# Patient Record
Sex: FEMALE | URBAN - METROPOLITAN AREA
[De-identification: names, ages, dates, MRNs, and addresses within clinical notes are randomized per-mention and may not be internally consistent; named-entity substitution may affect disease eponyms.]

---

## 2023-10-23 ENCOUNTER — APPOINTMENT (RX ONLY)
Dept: URBAN - METROPOLITAN AREA CLINIC 66 | Facility: CLINIC | Age: 58
Setting detail: DERMATOLOGY
End: 2023-10-23

## 2023-10-23 DIAGNOSIS — Q826 OTHER SPECIFIED ANOMALIES OF SKIN: ICD-10-CM

## 2023-10-23 DIAGNOSIS — L57.8 OTHER SKIN CHANGES DUE TO CHRONIC EXPOSURE TO NONIONIZING RADIATION: ICD-10-CM

## 2023-10-23 DIAGNOSIS — Q828 OTHER SPECIFIED ANOMALIES OF SKIN: ICD-10-CM

## 2023-10-23 DIAGNOSIS — Q819 OTHER SPECIFIED ANOMALIES OF SKIN: ICD-10-CM

## 2023-10-23 DIAGNOSIS — L82.1 OTHER SEBORRHEIC KERATOSIS: ICD-10-CM

## 2023-10-23 PROBLEM — L85.8 OTHER SPECIFIED EPIDERMAL THICKENING: Status: ACTIVE | Noted: 2023-10-23

## 2023-10-23 PROCEDURE — ? SKIN MEDICINALS

## 2023-10-23 PROCEDURE — 99203 OFFICE O/P NEW LOW 30 MIN: CPT

## 2023-10-23 PROCEDURE — ? ADDITIONAL NOTES

## 2023-10-23 PROCEDURE — ? COUNSELING

## 2023-10-23 ASSESSMENT — LOCATION SIMPLE DESCRIPTION DERM
LOCATION SIMPLE: RIGHT FOREARM
LOCATION SIMPLE: RIGHT POSTERIOR UPPER ARM
LOCATION SIMPLE: LEFT CHEEK
LOCATION SIMPLE: LEFT POSTERIOR UPPER ARM
LOCATION SIMPLE: RIGHT CHEEK
LOCATION SIMPLE: RIGHT PRETIBIAL REGION

## 2023-10-23 ASSESSMENT — LOCATION DETAILED DESCRIPTION DERM
LOCATION DETAILED: RIGHT DISTAL DORSAL FOREARM
LOCATION DETAILED: RIGHT PROXIMAL PRETIBIAL REGION
LOCATION DETAILED: RIGHT INFERIOR CENTRAL MALAR CHEEK
LOCATION DETAILED: LEFT INFERIOR CENTRAL MALAR CHEEK
LOCATION DETAILED: RIGHT DISTAL POSTERIOR UPPER ARM
LOCATION DETAILED: LEFT DISTAL POSTERIOR UPPER ARM

## 2023-10-23 ASSESSMENT — LOCATION ZONE DERM
LOCATION ZONE: LEG
LOCATION ZONE: FACE
LOCATION ZONE: ARM

## 2023-10-23 NOTE — PROCEDURE: ADDITIONAL NOTES
Detail Level: Simple
Additional Notes: Discussed trialing glycolic acid such as Glytone's KP kit, reviewed that this could cause irritation and to stop if it does so.
Render Risk Assessment In Note?: no

## 2023-10-23 NOTE — PROCEDURE: [facility]IC[facility]
Sig: Apply a thin layer to the affected areas twice daily
Sig: Apply to affected areas twice daily
Sig: Use as directed when washing hair
Sig: Apply to affected areas on face twice daily
Sig: Take one pill daily
Sig: Apply a thin layer to the affected nails daily
Sig: Apply to the affected skin twice daily
Sig: Take one twice daily
Sig: Apply nightly to warts nightly under occlusion
Sig: Take one pill twice daily
Sig: Apply pea sized amount per area at night
Sig: Apply twice daily for 5 days
Sig: Apply a thin layer to the itching areas twice daily as needed
Sig: Apply a thin layer to the affected areas daily
Sig: Apply a thin layer to the areas with decreased hair density 1-2 times daily
Sig: Apply a thin layer to the affected skin twice daily
Lightening Cream Prescription 1: Azealic Acid 20%, Kojic Acid 6%, Niacinamide 4% Cream
Sig: Wash affected areas daily.
Sig: Apply twice daily to areas of darker or uneven pigmentation
Sig: Apply a thin layer to the scar daily
Detail Level: Simple
Product Type (1): Lightening Cream
Intro Statement: I recommended the following products:

## 2023-11-09 ENCOUNTER — APPOINTMENT (RX ONLY)
Dept: URBAN - METROPOLITAN AREA CLINIC 68 | Facility: CLINIC | Age: 58
Setting detail: DERMATOLOGY
End: 2023-11-09

## 2023-11-09 DIAGNOSIS — Z41.9 ENCOUNTER FOR PROCEDURE FOR PURPOSES OTHER THAN REMEDYING HEALTH STATE, UNSPECIFIED: ICD-10-CM

## 2023-11-09 PROCEDURE — ? COSMETIC CONSULTATION: FILLERS

## 2023-11-09 PROCEDURE — ? COSMETIC CONSULTATION: BOTOX

## 2023-11-09 PROCEDURE — ? COSMETIC CONSULTATION: MICRONEEDLING

## 2023-11-09 PROCEDURE — ? BOTOX

## 2023-11-09 NOTE — PROCEDURE: BOTOX
Levator Labii Superioris Units: 0
Show Additional Area 1: Yes
Consent: Written consent obtained. Risks include but not limited to lid/brow ptosis, bruising, swelling, diplopia, temporary effect, incomplete chemical denervation.
Show Mentalis Units: No
Post-Care Instructions: Patient instructed to not lie down for 4 hours and limit physical activity for 24 hours.
Dilution (U/0.1 Cc): 4
Detail Level: Detailed
Show Inventory Tab: Hide
Incrementing Botox Units: By 0.5 Units

## 2023-12-05 ENCOUNTER — APPOINTMENT (RX ONLY)
Dept: URBAN - METROPOLITAN AREA CLINIC 68 | Facility: CLINIC | Age: 58
Setting detail: DERMATOLOGY
End: 2023-12-05

## 2023-12-05 DIAGNOSIS — Z41.9 ENCOUNTER FOR PROCEDURE FOR PURPOSES OTHER THAN REMEDYING HEALTH STATE, UNSPECIFIED: ICD-10-CM

## 2023-12-05 PROCEDURE — ? FILLERS

## 2023-12-05 PROCEDURE — ? SYLFIRM X

## 2023-12-05 NOTE — PROCEDURE: FILLERS
Decollete Filler Volume In Cc: 0
Map Statment: See Attach Map for Complete Details
Include Cannula Information In Note?: No
Anesthesia Type: 1% lidocaine with epinephrine
Filler: Radiesse
Anesthesia Volume In Cc: 0.5
Aspiration Statement: Aspiration was performed prior to injecting / site with filler.
Consent: Written consent obtained. Risks include but not limited to bruising, beading, irregular texture, ulceration, infection, allergic reaction, scar formation, incomplete augmentation, temporary nature, and procedural pain.
Detail Level: Detailed
Additional Anesthesia Volume In Cc: 6
Post-Care Instructions: After the procedure, patient instructed to apply ice to reduce swelling.

## 2023-12-05 NOTE — PROCEDURE: SYLFIRM X
Patient Reported Discomfort: 0
Consent: Written consent obtained, risks reviewed including but not limited to darker or lighter pigmentary change, and/or incomplete improvement.
Detail Level: Simple
Post-Care Instructions: I reviewed with the patient in detail post-care instructions. Patient should apply moisturizer until fully healed.
Infusion: '
Render Post-Care In The Note: Yes
Use Distraction Techniques In Note: No

## 2023-12-12 ENCOUNTER — APPOINTMENT (RX ONLY)
Dept: URBAN - METROPOLITAN AREA CLINIC 68 | Facility: CLINIC | Age: 58
Setting detail: DERMATOLOGY
End: 2023-12-12

## 2023-12-12 DIAGNOSIS — Z41.9 ENCOUNTER FOR PROCEDURE FOR PURPOSES OTHER THAN REMEDYING HEALTH STATE, UNSPECIFIED: ICD-10-CM

## 2023-12-12 PROCEDURE — ? DIAMONDGLOW

## 2023-12-12 ASSESSMENT — LOCATION SIMPLE DESCRIPTION DERM: LOCATION SIMPLE: LEFT FOREHEAD

## 2023-12-12 ASSESSMENT — LOCATION ZONE DERM: LOCATION ZONE: FACE

## 2023-12-12 ASSESSMENT — LOCATION DETAILED DESCRIPTION DERM: LOCATION DETAILED: LEFT LATERAL FOREHEAD

## 2023-12-12 NOTE — PROCEDURE: DIAMONDGLOW
Body Treatment Head Used?: Not Used
Solution Used: Skin Brightening
Number Of Passes: 4
Consent: Written consent obtained, risks reviewed including but not limited to crusting, scabbing, blistering, scarring, darker or lighter pigmentary change, bruising, and/or incomplete response.
Intelliflow Setting: 100%
Vacuum Pressure In Inches: 3.5
Post-Care Instructions: I reviewed with the patient in detail post-care instructions. Patient should stay away from the sun and wear sun protection until treated areas are fully healed.
Facial Treatment Head Used?: Facial: Medium-Fine (100 grit) 6 mm
Detail Level: Zone

## 2023-12-14 ENCOUNTER — APPOINTMENT (RX ONLY)
Dept: URBAN - METROPOLITAN AREA CLINIC 68 | Facility: CLINIC | Age: 58
Setting detail: DERMATOLOGY
End: 2023-12-14

## 2023-12-14 DIAGNOSIS — Z41.9 ENCOUNTER FOR PROCEDURE FOR PURPOSES OTHER THAN REMEDYING HEALTH STATE, UNSPECIFIED: ICD-10-CM

## 2023-12-14 PROCEDURE — ? FILLERS

## 2023-12-14 NOTE — PROCEDURE: FILLERS
Jawline Filler Volume In Cc: 0
Vouchr Ultra Syringe Price (Per 1.0 Cc- Numbers Only No Special Characters Or $): 0.00
Filler: Juvederm Voluma XC
Anesthesia Volume In Cc: 0.5
Pricing Information: This plan will add up the cumulative amount of filler you document and will bill based on the unit price noted below. For example if you inject 0.9 ccs of Restylane it will bill for one unit. If you inject 1.3 ccs it will bill for two units.
Additional Anesthesia Volume In Cc: 6
Use Map Statement For Sites (Optional): No
Post-Care Instructions: Patient instructed to apply ice to reduce swelling.
Pre-Treatment: Skin was cleaned with alcohol prior to injections.
Map Statment: See Attach Map for Complete Details
Detail Level: Detailed
Anesthesia Type: 1% lidocaine with epinephrine
Filler: Juvederm Ultra XC
Consent: Written consent obtained. Risks include but not limited to bruising, beading, irregular texture, ulceration, infection, allergic reaction, scar formation, incomplete augmentation, temporary nature, procedural pain.

## 2024-12-10 ENCOUNTER — RX ONLY (RX ONLY)
Age: 59
End: 2024-12-10